# Patient Record
Sex: MALE | Race: WHITE | NOT HISPANIC OR LATINO | Employment: OTHER | URBAN - NONMETROPOLITAN AREA
[De-identification: names, ages, dates, MRNs, and addresses within clinical notes are randomized per-mention and may not be internally consistent; named-entity substitution may affect disease eponyms.]

---

## 2017-01-24 ENCOUNTER — TELEMEDICINE ORIGINATING SITE VISIT (OUTPATIENT)
Dept: MEDICAL GROUP | Facility: CLINIC | Age: 62
End: 2017-01-24
Payer: MEDICARE

## 2017-01-24 ENCOUNTER — TELEMEDICINE2 (OUTPATIENT)
Dept: MEDICAL GROUP | Facility: PHYSICIAN GROUP | Age: 62
End: 2017-01-24
Payer: MEDICARE

## 2017-01-24 ENCOUNTER — TELEPHONE (OUTPATIENT)
Dept: MEDICAL GROUP | Facility: PHYSICIAN GROUP | Age: 62
End: 2017-01-24

## 2017-01-24 ENCOUNTER — TELEPHONE (OUTPATIENT)
Dept: MEDICAL GROUP | Facility: CLINIC | Age: 62
End: 2017-01-24

## 2017-01-24 ENCOUNTER — APPOINTMENT (OUTPATIENT)
Dept: RADIOLOGY | Facility: IMAGING CENTER | Age: 62
End: 2017-01-24
Attending: NURSE PRACTITIONER
Payer: MEDICARE

## 2017-01-24 VITALS
HEART RATE: 81 BPM | RESPIRATION RATE: 16 BRPM | OXYGEN SATURATION: 92 % | TEMPERATURE: 96.6 F | DIASTOLIC BLOOD PRESSURE: 71 MMHG | WEIGHT: 210 LBS | HEIGHT: 75 IN | SYSTOLIC BLOOD PRESSURE: 114 MMHG | BODY MASS INDEX: 26.11 KG/M2

## 2017-01-24 DIAGNOSIS — J18.9 PNEUMONIA OF BOTH LOWER LOBES DUE TO INFECTIOUS ORGANISM: ICD-10-CM

## 2017-01-24 DIAGNOSIS — I50.42 CHRONIC COMBINED SYSTOLIC AND DIASTOLIC CONGESTIVE HEART FAILURE (HCC): ICD-10-CM

## 2017-01-24 DIAGNOSIS — R05.9 COUGH: ICD-10-CM

## 2017-01-24 DIAGNOSIS — Z95.5 HISTORY OF CORONARY ARTERY STENT PLACEMENT: ICD-10-CM

## 2017-01-24 DIAGNOSIS — E78.2 MIXED HYPERLIPIDEMIA: ICD-10-CM

## 2017-01-24 DIAGNOSIS — I10 ESSENTIAL HYPERTENSION: ICD-10-CM

## 2017-01-24 DIAGNOSIS — R53.82 CHRONIC FATIGUE: ICD-10-CM

## 2017-01-24 PROCEDURE — 71020 DX-CHEST-2 VIEWS: CPT | Mod: TC | Performed by: NURSE PRACTITIONER

## 2017-01-24 PROCEDURE — 99203 OFFICE O/P NEW LOW 30 MIN: CPT | Mod: GT | Performed by: NURSE PRACTITIONER

## 2017-01-24 RX ORDER — AZITHROMYCIN 250 MG/1
TABLET, FILM COATED ORAL
Qty: 6 TAB | Refills: 0 | Status: SHIPPED | OUTPATIENT
Start: 2017-01-24

## 2017-01-24 RX ORDER — CARVEDILOL 6.25 MG/1
TABLET ORAL
Refills: 3 | COMMUNITY
Start: 2016-12-15

## 2017-01-24 RX ORDER — DOXYCYCLINE HYCLATE 100 MG
100 TABLET ORAL 2 TIMES DAILY
Qty: 20 TAB | Refills: 0 | Status: SHIPPED | OUTPATIENT
Start: 2017-01-24

## 2017-01-24 RX ORDER — LISINOPRIL 20 MG/1
TABLET ORAL
Refills: 3 | COMMUNITY
Start: 2016-12-15

## 2017-01-24 RX ORDER — CLOPIDOGREL BISULFATE 75 MG/1
TABLET ORAL
Refills: 3 | COMMUNITY
Start: 2016-12-15

## 2017-01-24 RX ORDER — FUROSEMIDE 40 MG/1
TABLET ORAL
Refills: 3 | COMMUNITY
Start: 2016-12-15

## 2017-01-24 RX ORDER — ATORVASTATIN CALCIUM 20 MG/1
TABLET, FILM COATED ORAL
Refills: 3 | COMMUNITY
Start: 2016-12-15

## 2017-01-24 ASSESSMENT — PATIENT HEALTH QUESTIONNAIRE - PHQ9: CLINICAL INTERPRETATION OF PHQ2 SCORE: 0

## 2017-01-24 NOTE — TELEPHONE ENCOUNTER
I spoke to patient.  He picked up the Doxycycline. He said that if he is not in town in a month he will follow up with his PCP in [place].  He will call us if any symptoms worsen.

## 2017-01-24 NOTE — TELEPHONE ENCOUNTER
Xray shows densities in lower lobes:   Possible pneumonia?? Need to treat and recheck.   Will treat and recheck in one month.  Please notify patient.  I will send RX to [facility]

## 2017-01-24 NOTE — MR AVS SNAPSHOT
"        Manish Francisco   2017 11:00 AM   Telemedicine2   MRN: [id number]    Department:  [facility]   Dept Phone:  [phone]    Description:  Male : 1955   Provider:  MACKENZIE Dorsey; TELEMED [place]           Reason for Visit     URI           Allergies as of 2017     No Known Allergies      You were diagnosed with     Essential hypertension   [1191576]       Cough   [786.2.ICD-9-CM]       Chronic combined systolic and diastolic congestive heart failure (CMS-HCC)   [569609]       Mixed hyperlipidemia   [272.2.ICD-9-CM]       History of coronary artery stent placement   [283291]       Chronic fatigue   [607781]         Vital Signs     Blood Pressure Pulse Temperature Respirations Height Weight    114/71 mmHg 81 35.9 °C (96.6 °F) 16 1.905 m (6' 3\") 95.255 kg (210 lb)    Body Mass Index Oxygen Saturation Smoking Status             26.25 kg/m2 92% Former Smoker         Basic Information     Date Of Birth Sex Race Ethnicity Preferred Language    1955 Male White Non- English      Problem List              ICD-10-CM Priority Class Noted - Resolved    HTN (hypertension) I10   2017 - Present    Cough R05   2017 - Present    Chronic combined systolic and diastolic congestive heart failure (CMS-HCC) I50.42   2017 - Present    Mixed hyperlipidemia E78.2   2017 - Present    History of coronary artery stent placement Z95.5   2017 - Present    Chronic fatigue R53.82   2017 - Present      Health Maintenance     Patient has no pending health maintenance at this time      Current Immunizations     No immunizations on file.      Below and/or attached are the medications your provider expects you to take. Review all of your home medications and newly ordered medications with your provider and/or pharmacist. Follow medication instructions as directed by your provider and/or pharmacist. Please keep your medication list with you and share with your provider. " Update the information when medications are discontinued, doses are changed, or new medications (including over-the-counter products) are added; and carry medication information at all times in the event of emergency situations     Allergies:  No Known Allergies          Medications  Valid as of: January 24, 2017 - 11:16 AM    Generic Name Brand Name Tablet Size Instructions for use    Atorvastatin Calcium (Tab) LIPITOR 20 MG TAKE 1 TABLET (20 MG TOTAL) BY MOUTH DAILY        Carvedilol (Tab) COREG 6.25 MG TAKE 1 TABLET (6.25 MG TOTAL) BY MOUTH 2 (TWO) TIMES DAILY        Clopidogrel Bisulfate (Tab) PLAVIX 75 MG TAKE 1 TABLET (75 MG TOTAL) BY MOUTH DAILY        Furosemide (Tab) LASIX 40 MG TAKE 1 TABLET (40 MG TOTAL) BY MOUTH DAILY        Lisinopril (Tab) PRINIVIL 20 MG TAKE 1 TABLET (20 MG TOTAL) BY MOUTH DAILY        NON SPECIFIED   Overnight oximetry  DX: Short of breath. CHF, fatigue.        .                 Medicines prescribed today were sent to:     [id number] [place] [facility] [address]    [address] [facility] [place] [address]    Phone: [phone] Fax: [phone]    Open 24 Hours?: No      Medication refill instructions:       If your prescription bottle indicates you have medication refills left, it is not necessary to call your provider’s office. Please contact your pharmacy and they will refill your medication.    If your prescription bottle indicates you do not have any refills left, you may request refills at any time through one of the following ways: The online MenuSpring system (except Urgent Care), by calling your provider’s office, or by asking your pharmacy to contact your provider’s office with a refill request. Medication refills are processed only during regular business hours and may not be available until the next business day. Your provider may request additional information or to have a follow-up visit with you prior to refilling your medication.   *Please Note: Medication  refills are assigned a new Rx number when refilled electronically. Your pharmacy may indicate that no refills were authorized even though a new prescription for the same medication is available at the pharmacy. Please request the medicine by name with the pharmacy before contacting your provider for a refill.        Your To Do List     Future Labs/Procedures Complete By Expires    CBC WITH DIFFERENTIAL  As directed 1/24/2018    COMP METABOLIC PANEL  As directed 1/24/2018    DX-CHEST-2 VIEWS  As directed 1/24/2018         Zazzle Access Code: [email or web address]  Expires: 2/23/2017 11:16 AM    Zazzle  A secure, online tool to manage your health information     [employer]’s Zazzle® is a secure, online tool that connects you to your personalized health information from the privacy of your home -- day or night - making it very easy for you to manage your healthcare. Once the activation process is completed, you can even access your medical information using the Zazzle anamaria, which is available for free in the Apple Anamaria store or Google Play store.     Zazzle provides the following levels of access (as shown below):   My Chart Features   [facility] Doctor [facility]  Specialists [facility]  Urgent  Care Non-[facility]  Primary Care  Doctor   Email your healthcare team securely and privately 24/7 X X X    Manage appointments: schedule your next appointment; view details of past/upcoming appointments X      Request prescription refills. X      View recent personal medical records, including lab and immunizations X X X X   View health record, including health history, allergies, medications X X X X   Read reports about your outpatient visits, procedures, consult and ER notes X X X X   See your discharge summary, which is a recap of your hospital and/or ER visit that includes your diagnosis, lab results, and care plan. X X       How to register for Zazzle:  1. Go to  [email or web address].  2. Click on the Sign Up Now  box, which takes you to the New Member Sign Up page. You will need to provide the following information:  a. Enter your Graspr Access Code exactly as it appears at the top of this page. (You will not need to use this code after you’ve completed the sign-up process. If you do not sign up before the expiration date, you must request a new code.)   b. Enter your date of birth.   c. Enter your home email address.   d. Click Submit, and follow the next screen’s instructions.  3. Create a Graspr ID. This will be your Graspr login ID and cannot be changed, so think of one that is secure and easy to remember.  4. Create a Graspr password. You can change your password at any time.  5. Enter your Password Reset Question and Answer. This can be used at a later time if you forget your password.   6. Enter your e-mail address. This allows you to receive e-mail notifications when new information is available in Graspr.  7. Click Sign Up. You can now view your health information.    For assistance activating your Graspr account, call [phone]

## 2017-01-24 NOTE — TELEPHONE ENCOUNTER
[facility] called and says patient doesn't have insurance, they've discounted the Doxycycline to $85.90 but he says he can't afford it.  Is there something else he can take or do I need to tell them that is all that will work?

## 2017-01-25 ENCOUNTER — TELEPHONE (OUTPATIENT)
Dept: MEDICAL GROUP | Facility: PHYSICIAN GROUP | Age: 62
End: 2017-01-25

## 2017-01-25 NOTE — ASSESSMENT & PLAN NOTE
Patient reports placement of coronary stents. These were done in [place]. Has cardiology follow-up and is stable.

## 2017-01-25 NOTE — PROGRESS NOTES
"Chief Complaint   Patient presents with   • URI       HISTORY OF PRESENT ILLNESS: Patient is a 61 y.o. male [facility] new patient not wanting to establish but here for same day ,patient, who presents today to to discuss:  Verified Identification with patient.  Secured video conference with RN presenter in [place], [place]        Chronic combined systolic and diastolic congestive heart failure (CMS-HCC)  This is a 61-year-old male whose reports that he has congestive heart failure. Apparently he sees his cardiologist out of state in [place]. He currently takes Lipitor 20 mg carvedilol 6.25 mg, Plavix 75 mg one daily Lasix 40 mg and lisinopril 20 mg a day.  Patient states that he monitors his blood pressure, daily weights, and keeps in touch with his cardiologist. He states that he feels like this is under control. No chest pain shortness of breath noted other than recent cough and fatigue. No peripheral edema noted. Weight according to patient is stable    Chronic fatigue  Patient normally has general fatigue but in recent weeks has noticed increased. He thought this was primarily from having \"a cold\" or flulike syndrome but he states that he'll get up and immediately has to sit down because he feels exhausted. However he is not lightheaded, dizzy or change of vision.    Cough  Patient states that he's had a lingering cough after a cold. Sometimes the cough is productive. He however is not wheezing or having chest wall pain with the cough. The cough sometimes will keep him up at night feels like a tickle in the back of his throat.    HTN (hypertension)  Patient has history of chronic hypertension which is well-controlled controlled with lisinopril and Coreg.    History of coronary artery stent placement  Patient reports placement of coronary stents. These were done in [place]. Has cardiology follow-up and is stable.    Mixed hyperlipidemia  Patient has a history of hyperlipidemia associated with coronary artery disease. He " "is on Lipitor 20 mg one daily. He reports that his lipid profile in December which was normal.        Allergies:Review of patient's allergies indicates no known allergies.    Current Outpatient Prescriptions Ordered in [facility]   Medication Sig Dispense Refill   • atorvastatin (LIPITOR) 20 MG Tab TAKE 1 TABLET (20 MG TOTAL) BY MOUTH DAILY  3   • clopidogrel (PLAVIX) 75 MG Tab TAKE 1 TABLET (75 MG TOTAL) BY MOUTH DAILY  3   • furosemide (LASIX) 40 MG Tab TAKE 1 TABLET (40 MG TOTAL) BY MOUTH DAILY  3   • carvedilol (COREG) 6.25 MG Tab TAKE 1 TABLET (6.25 MG TOTAL) BY MOUTH 2 (TWO) TIMES DAILY  3   • lisinopril (PRINIVIL) 20 MG Tab TAKE 1 TABLET (20 MG TOTAL) BY MOUTH DAILY  3   • NON SPECIFIED Overnight oximetry  DX: Short of breath. CHF, fatigue. 1 Each 0   • doxycycline (VIBRAMYCIN) 100 MG Tab Take 1 Tab by mouth 2 times a day. 20 Tab 0   • azithromycin (ZITHROMAX) 250 MG Tab Two tablets today and then one daily until finished. 6 Tab 0     No current Epic-ordered facility-administered medications on file.       Past Medical History   Diagnosis Date   • Hyperlipidemia    • Hypertension        Social History   Substance Use Topics   • Smoking status: Former Smoker     Quit date: 06/01/2016   • Smokeless tobacco: Never Used   • Alcohol Use: No       No family status information on file.   History reviewed. No pertinent family history.    ROS: As documented in my HPI      Exam:  Blood pressure 114/71, pulse 81, temperature 35.9 °C (96.6 °F), resp. rate 16, height 1.905 m (6' 3\"), weight 95.255 kg (210 lb), SpO2 92 %.  General:  Well nourished, well developed male in NAD  Head: No lesions, Non tender scalp  Neck: Supple. Symmetric Thyroid visualized during exam.   Pulmonary: Fair effort. No rales, ronchi, or wheezing.  Cardiovascular: Regular rate and rhythm without murmur.   Abdomen: Soft nontender, normal bowel sounds  Extremities: no clubbing, cyanosis, or edema.  Psych: Alert and oriented x3. Normal mood and affect. "   Neurological: No focal deficitsPlease note that this dictation was created using voice recognition software. I have made every reasonable attempt to correct obvious errors, but I expect that there are errors of grammar and possibly content that I did not discover before finalizing the note.    Assessment/Plan:  1. Essential hypertension  DX-CHEST-2 VIEWS    CBC WITH DIFFERENTIAL    B TYPE NATRIURETIC    COMP METABOLIC PANEL    TSH+FREE T4   2. Cough  DX-CHEST-2 VIEWS    CBC WITH DIFFERENTIAL    B TYPE NATRIURETIC    COMP METABOLIC PANEL    TSH+FREE T4   3. Chronic combined systolic and diastolic congestive heart failure (CMS-HCC)  DX-CHEST-2 VIEWS    CBC WITH DIFFERENTIAL    B TYPE NATRIURETIC    COMP METABOLIC PANEL    TSH+FREE T4   4. Mixed hyperlipidemia  DX-CHEST-2 VIEWS    CBC WITH DIFFERENTIAL    B TYPE NATRIURETIC    COMP METABOLIC PANEL    TSH+FREE T4   5. History of coronary artery stent placement  DX-CHEST-2 VIEWS    CBC WITH DIFFERENTIAL    B TYPE NATRIURETIC    COMP METABOLIC PANEL    TSH+FREE T4   6. Chronic fatigue  DX-CHEST-2 VIEWS    CBC WITH DIFFERENTIAL    B TYPE NATRIURETIC    COMP METABOLIC PANEL    TSH+FREE T4   Report of chest x-ray shows basilar atelectasis which could mean pneumonia. Will treat with antibiotics and doxycycline 100 mg twice a day, recommended repeat checks x-ray in 6 weeks, OPO for onset of increased fatigue and shortness of breath and saturations at 92%.    Patient went to obtain antibiotics found that it was very cost prohibitive I reordered a new medication of azithromycin, patient elected for doxycycline. Apparently patient will follow up with his cardiologist in [place].

## 2017-01-25 NOTE — ASSESSMENT & PLAN NOTE
Patient has a history of hyperlipidemia associated with coronary artery disease. He is on Lipitor 20 mg one daily. He reports that his lipid profile in December which was normal.

## 2017-01-25 NOTE — ASSESSMENT & PLAN NOTE
Patient has history of chronic hypertension which is well-controlled controlled with lisinopril and Coreg.

## 2017-01-25 NOTE — ASSESSMENT & PLAN NOTE
"Patient normally has general fatigue but in recent weeks has noticed increased. He thought this was primarily from having \"a cold\" or flulike syndrome but he states that he'll get up and immediately has to sit down because he feels exhausted. However he is not lightheaded, dizzy or change of vision.  "

## 2017-01-25 NOTE — TELEPHONE ENCOUNTER
Reviewed labs: Please notify patient of results.  In general fasting blood sugars up a little, kidney function is down which is probably Chronic problem for him. Thyroid normal. CBC basically normal.

## 2017-01-25 NOTE — ASSESSMENT & PLAN NOTE
Patient states that he's had a lingering cough after a cold. Sometimes the cough is productive. He however is not wheezing or having chest wall pain with the cough. The cough sometimes will keep him up at night feels like a tickle in the back of his throat.

## 2017-01-25 NOTE — ASSESSMENT & PLAN NOTE
This is a 61-year-old male whose reports that he has congestive heart failure. Apparently he sees his cardiologist out of state in [place]. He currently takes Lipitor 20 mg carvedilol 6.25 mg, Plavix 75 mg one daily Lasix 40 mg and lisinopril 20 mg a day.  Patient states that he monitors his blood pressure, daily weights, and keeps in touch with his cardiologist. He states that he feels like this is under control. No chest pain shortness of breath noted other than recent cough and fatigue. No peripheral edema noted. Weight according to patient is stable

## 2017-01-31 ENCOUNTER — TELEPHONE (OUTPATIENT)
Dept: MEDICAL GROUP | Facility: CLINIC | Age: 62
End: 2017-01-31

## 2017-01-31 NOTE — TELEPHONE ENCOUNTER
We spoke to [facility] in [place], they said if you write the O2 order they will help coordinate patient care with the [facility] in [place].